# Patient Record
Sex: FEMALE | Race: WHITE | Employment: UNEMPLOYED | URBAN - METROPOLITAN AREA
[De-identification: names, ages, dates, MRNs, and addresses within clinical notes are randomized per-mention and may not be internally consistent; named-entity substitution may affect disease eponyms.]

---

## 2017-02-20 RX ORDER — MOMETASONE FUROATE AND FORMOTEROL FUMARATE DIHYDRATE 200; 5 UG/1; UG/1
AEROSOL RESPIRATORY (INHALATION)
Qty: 13 G | Refills: 0 | OUTPATIENT
Start: 2017-02-20

## 2017-07-12 ENCOUNTER — PATIENT OUTREACH (OUTPATIENT)
Dept: FAMILY MEDICINE CLINIC | Facility: CLINIC | Age: 22
End: 2017-07-12

## 2017-10-22 ENCOUNTER — CHARTING TRANS (OUTPATIENT)
Dept: URGENT CARE | Age: 22
End: 2017-10-22

## 2017-12-04 ENCOUNTER — HOSPITAL ENCOUNTER (EMERGENCY)
Facility: HOSPITAL | Age: 22
Discharge: HOME | End: 2017-12-04
Attending: EMERGENCY MEDICINE
Payer: COMMERCIAL

## 2017-12-04 VITALS
HEIGHT: 64 IN | DIASTOLIC BLOOD PRESSURE: 66 MMHG | BODY MASS INDEX: 24.41 KG/M2 | TEMPERATURE: 97 F | SYSTOLIC BLOOD PRESSURE: 100 MMHG | WEIGHT: 143 LBS | OXYGEN SATURATION: 100 % | HEART RATE: 79 BPM | RESPIRATION RATE: 18 BRPM

## 2017-12-04 DIAGNOSIS — O21.0 HYPEREMESIS GRAVIDARUM: Primary | ICD-10-CM

## 2017-12-04 PROCEDURE — 87086 URINE CULTURE/COLONY COUNT: CPT | Performed by: EMERGENCY MEDICINE

## 2017-12-04 PROCEDURE — 96376 TX/PRO/DX INJ SAME DRUG ADON: CPT

## 2017-12-04 PROCEDURE — 81001 URINALYSIS AUTO W/SCOPE: CPT | Performed by: EMERGENCY MEDICINE

## 2017-12-04 PROCEDURE — 96361 HYDRATE IV INFUSION ADD-ON: CPT

## 2017-12-04 PROCEDURE — 96374 THER/PROPH/DIAG INJ IV PUSH: CPT

## 2017-12-04 PROCEDURE — 80048 BASIC METABOLIC PNL TOTAL CA: CPT | Performed by: EMERGENCY MEDICINE

## 2017-12-04 PROCEDURE — 99285 EMERGENCY DEPT VISIT HI MDM: CPT

## 2017-12-04 PROCEDURE — 99284 EMERGENCY DEPT VISIT MOD MDM: CPT

## 2017-12-04 RX ORDER — ONDANSETRON 4 MG/1
4 TABLET, ORALLY DISINTEGRATING ORAL EVERY 4 HOURS PRN
Qty: 10 TABLET | Refills: 0 | Status: SHIPPED | OUTPATIENT
Start: 2017-12-04 | End: 2017-12-11

## 2017-12-04 RX ORDER — DEXTROSE AND SODIUM CHLORIDE 5; .9 G/100ML; G/100ML
INJECTION, SOLUTION INTRAVENOUS ONCE
Status: COMPLETED | OUTPATIENT
Start: 2017-12-04 | End: 2017-12-04

## 2017-12-04 RX ORDER — ONDANSETRON 2 MG/ML
4 INJECTION INTRAMUSCULAR; INTRAVENOUS ONCE
Status: COMPLETED | OUTPATIENT
Start: 2017-12-04 | End: 2017-12-04

## 2017-12-04 NOTE — ED INITIAL ASSESSMENT (HPI)
10 weeks pregnant. Hyperemesis since 4 weeks. Changed OB. Recently treated at another hospital with IV fluids 3 times per week. Pain left hip. No cramping. No vaginal discharge.

## 2017-12-04 NOTE — CM/SW NOTE
Asked to see patient by ER MD. Patient with multiple ER visits for hyperemesis. Current OB at [address] not wanting to order home IVF. Patient is waiting to hear back from Dr. Richard Pulido office re: new appointment, but nothing available soon. She has no PCP.

## 2017-12-04 NOTE — ED NOTES
Patient continues to c/o nausea and unable to tolerate full cup of water. Second dose of zofran administered. Patient ambulating to washroom with steady gait and provided with cup for urine sample.

## 2017-12-04 NOTE — ED PROVIDER NOTES
Patient Seen in: [facility] Emergency Department    History   Patient presents with:  Pregnancy Issues (gynecologic)    Stated Complaint: hyperemesis, weight loss    HPI    78-year-old female who is 10 weeks pregnant complaining of vomiting the alannah Triage Vitals [12/04/17 1203]  BP: 128/77  Pulse: 92  Resp: 14  Temp: (!) 97.1 °F (36.2 °C)  Temp src: Oral  SpO2: 98 %  O2 Device: None (Room air)    Current:/66   Pulse 79   Temp (!) 97.1 °F (36.2 °C) (Oral)   Resp 18   Ht 162.6 cm (5' 4\")   Wt 64 taking these medications    !! ondansetron 4 MG Oral Tablet Dispersible  Take 1 tablet (4 mg total) by mouth every 4 (four) hours as needed for Nausea. Qty: 10 tablet Refills: 0    !! - Potential duplicate medications found. Please discuss with provider.

## 2017-12-04 NOTE — ED NOTES
Discharge instructions reviewed with patient, patient instructed to call OB in AM to set up appointment and discuss POC. Patient verbalized understanding, all questions answered. Patient discharged home in good condition.

## 2017-12-07 PROCEDURE — 87086 URINE CULTURE/COLONY COUNT: CPT | Performed by: OBSTETRICS & GYNECOLOGY

## 2017-12-14 ENCOUNTER — HOSPITAL ENCOUNTER (OUTPATIENT)
Dept: PERIOP | Facility: HOSPITAL | Age: 22
Discharge: HOME | End: 2017-12-14
Attending: OBSTETRICS & GYNECOLOGY
Payer: COMMERCIAL

## 2017-12-14 ENCOUNTER — APPOINTMENT (OUTPATIENT)
Dept: LAB | Facility: HOSPITAL | Age: 22
End: 2017-12-14
Attending: OBSTETRICS & GYNECOLOGY
Payer: COMMERCIAL

## 2017-12-14 VITALS
HEART RATE: 70 BPM | RESPIRATION RATE: 16 BRPM | TEMPERATURE: 99 F | DIASTOLIC BLOOD PRESSURE: 70 MMHG | SYSTOLIC BLOOD PRESSURE: 115 MMHG

## 2017-12-14 DIAGNOSIS — O21.0 HYPEREMESIS GRAVIDARUM: ICD-10-CM

## 2017-12-14 PROCEDURE — 76937 US GUIDE VASCULAR ACCESS: CPT

## 2017-12-14 PROCEDURE — 96374 THER/PROPH/DIAG INJ IV PUSH: CPT

## 2017-12-14 PROCEDURE — 96367 TX/PROPH/DG ADDL SEQ IV INF: CPT

## 2017-12-14 PROCEDURE — 96375 TX/PRO/DX INJ NEW DRUG ADDON: CPT

## 2017-12-14 PROCEDURE — 36415 COLL VENOUS BLD VENIPUNCTURE: CPT

## 2017-12-14 PROCEDURE — 80053 COMPREHEN METABOLIC PANEL: CPT

## 2017-12-14 PROCEDURE — 96365 THER/PROPH/DIAG IV INF INIT: CPT

## 2017-12-14 PROCEDURE — 96361 HYDRATE IV INFUSION ADD-ON: CPT

## 2017-12-14 PROCEDURE — 96366 THER/PROPH/DIAG IV INF ADDON: CPT

## 2017-12-14 PROCEDURE — 96360 HYDRATION IV INFUSION INIT: CPT

## 2017-12-14 PROCEDURE — 36569 INSJ PICC 5 YR+ W/O IMAGING: CPT

## 2017-12-14 RX ORDER — DEXTROSE, SODIUM CHLORIDE, SODIUM LACTATE, POTASSIUM CHLORIDE, AND CALCIUM CHLORIDE 5; .6; .31; .03; .02 G/100ML; G/100ML; G/100ML; G/100ML; G/100ML
INJECTION, SOLUTION INTRAVENOUS CONTINUOUS
Status: DISCONTINUED | OUTPATIENT
Start: 2017-12-14 | End: 2017-12-15

## 2017-12-14 RX ORDER — ONDANSETRON 2 MG/ML
4 INJECTION INTRAMUSCULAR; INTRAVENOUS ONCE
Status: COMPLETED | OUTPATIENT
Start: 2017-12-14 | End: 2017-12-14

## 2017-12-14 RX ORDER — SODIUM CHLORIDE 0.9 % (FLUSH) 0.9 %
10 SYRINGE (ML) INJECTION EVERY 12 HOURS
Status: DISCONTINUED | OUTPATIENT
Start: 2017-12-14 | End: 2017-12-14

## 2017-12-14 RX ORDER — SODIUM CHLORIDE 0.9 % (FLUSH) 0.9 %
10 SYRINGE (ML) INJECTION AS NEEDED
Status: DISCONTINUED | OUTPATIENT
Start: 2017-12-14 | End: 2017-12-15

## 2017-12-14 RX ADMIN — DEXTROSE, SODIUM CHLORIDE, SODIUM LACTATE, POTASSIUM CHLORIDE, AND CALCIUM CHLORIDE: 5; .6; .31; .03; .02 INJECTION, SOLUTION INTRAVENOUS at 08:50:00

## 2017-12-14 RX ADMIN — ONDANSETRON 4 MG: 2 INJECTION INTRAMUSCULAR; INTRAVENOUS at 08:56:00

## 2017-12-14 NOTE — OR NURSING
Patient arrived to floor, vitals taken and name band placed after identification verified. IV infusion started. Patient resting in chair. Will monitor.

## 2017-12-14 NOTE — OR NURSING
Infusion completed without difficulty, patient tolerated well. PICC line flushed with 0.9 NS per protocol. Patient ambulated off floor in stable condition.

## 2018-01-05 PROBLEM — O21.0 HYPEREMESIS AFFECTING PREGNANCY, ANTEPARTUM: Status: ACTIVE | Noted: 2018-01-05

## 2018-02-08 PROCEDURE — 82105 ALPHA-FETOPROTEIN SERUM: CPT | Performed by: OBSTETRICS & GYNECOLOGY

## 2018-02-08 PROCEDURE — 36415 COLL VENOUS BLD VENIPUNCTURE: CPT | Performed by: OBSTETRICS & GYNECOLOGY

## 2018-03-23 ENCOUNTER — HOSPITAL ENCOUNTER (EMERGENCY)
Facility: HOSPITAL | Age: 23
Discharge: HOME | End: 2018-03-23
Attending: EMERGENCY MEDICINE
Payer: COMMERCIAL

## 2018-03-23 ENCOUNTER — APPOINTMENT (OUTPATIENT)
Dept: CT IMAGING | Facility: HOSPITAL | Age: 23
End: 2018-03-23
Attending: EMERGENCY MEDICINE
Payer: COMMERCIAL

## 2018-03-23 VITALS
SYSTOLIC BLOOD PRESSURE: 112 MMHG | OXYGEN SATURATION: 98 % | HEART RATE: 71 BPM | BODY MASS INDEX: 27.46 KG/M2 | DIASTOLIC BLOOD PRESSURE: 62 MMHG | HEIGHT: 63 IN | RESPIRATION RATE: 23 BRPM | WEIGHT: 155 LBS

## 2018-03-23 DIAGNOSIS — R42 DIZZINESS: ICD-10-CM

## 2018-03-23 DIAGNOSIS — R06.00 DYSPNEA, UNSPECIFIED TYPE: Primary | ICD-10-CM

## 2018-03-23 LAB
ALBUMIN SERPL-MCNC: 2.7 G/DL (ref 3.5–4.8)
ALP LIVER SERPL-CCNC: 64 U/L (ref 37–98)
ALT SERPL-CCNC: 12 U/L (ref 14–54)
AST SERPL-CCNC: 12 U/L (ref 15–41)
ATRIAL RATE: 80 BPM
BASOPHILS # BLD AUTO: 0.06 X10(3) UL (ref 0–0.1)
BASOPHILS NFR BLD AUTO: 0.7 %
BILIRUB SERPL-MCNC: 0.2 MG/DL (ref 0.1–2)
BUN BLD-MCNC: 5 MG/DL (ref 8–20)
CALCIUM BLD-MCNC: 8.5 MG/DL (ref 8.3–10.3)
CHLORIDE: 110 MMOL/L (ref 101–111)
CO2: 21 MMOL/L (ref 22–32)
CREAT BLD-MCNC: 0.56 MG/DL (ref 0.55–1.02)
EOSINOPHIL # BLD AUTO: 0.26 X10(3) UL (ref 0–0.3)
EOSINOPHIL NFR BLD AUTO: 2.9 %
ERYTHROCYTE [DISTWIDTH] IN BLOOD BY AUTOMATED COUNT: 11.9 % (ref 11.5–16)
GLUCOSE BLD-MCNC: 103 MG/DL (ref 70–99)
HCT VFR BLD AUTO: 34.2 % (ref 34–50)
HGB BLD-MCNC: 12.1 G/DL (ref 12–16)
IMMATURE GRANULOCYTE COUNT: 0.04 X10(3) UL (ref 0–1)
IMMATURE GRANULOCYTE RATIO %: 0.4 %
LYMPHOCYTES # BLD AUTO: 1.63 X10(3) UL (ref 0.9–4)
LYMPHOCYTES NFR BLD AUTO: 17.9 %
M PROTEIN MFR SERPL ELPH: 6.1 G/DL (ref 6.1–8.3)
MCH RBC QN AUTO: 31.9 PG (ref 27–33.2)
MCHC RBC AUTO-ENTMCNC: 35.4 G/DL (ref 31–37)
MCV RBC AUTO: 90.2 FL (ref 81–100)
MONOCYTES # BLD AUTO: 0.5 X10(3) UL (ref 0.1–1)
MONOCYTES NFR BLD AUTO: 5.5 %
NEUTROPHIL ABS PRELIM: 6.62 X10 (3) UL (ref 1.3–6.7)
NEUTROPHILS # BLD AUTO: 6.62 X10(3) UL (ref 1.3–6.7)
NEUTROPHILS NFR BLD AUTO: 72.6 %
P AXIS: 20 DEGREES
P-R INTERVAL: 128 MS
PLATELET # BLD AUTO: 226 10(3)UL (ref 150–450)
POTASSIUM SERPL-SCNC: 3.6 MMOL/L (ref 3.6–5.1)
Q-T INTERVAL: 388 MS
QRS DURATION: 70 MS
QTC CALCULATION (BEZET): 447 MS
R AXIS: 67 DEGREES
RBC # BLD AUTO: 3.79 X10(6)UL (ref 3.8–5.1)
RED CELL DISTRIBUTION WIDTH-SD: 39 FL (ref 35.1–46.3)
SODIUM SERPL-SCNC: 141 MMOL/L (ref 136–144)
T AXIS: 26 DEGREES
VENTRICULAR RATE: 80 BPM
WBC # BLD AUTO: 9.1 X10(3) UL (ref 4–13)

## 2018-03-23 PROCEDURE — 93010 ELECTROCARDIOGRAM REPORT: CPT

## 2018-03-23 PROCEDURE — 99285 EMERGENCY DEPT VISIT HI MDM: CPT

## 2018-03-23 PROCEDURE — 71275 CT ANGIOGRAPHY CHEST: CPT | Performed by: EMERGENCY MEDICINE

## 2018-03-23 PROCEDURE — 93005 ELECTROCARDIOGRAM TRACING: CPT

## 2018-03-23 PROCEDURE — 80053 COMPREHEN METABOLIC PANEL: CPT | Performed by: EMERGENCY MEDICINE

## 2018-03-23 PROCEDURE — 85025 COMPLETE CBC W/AUTO DIFF WBC: CPT | Performed by: EMERGENCY MEDICINE

## 2018-03-23 PROCEDURE — 36415 COLL VENOUS BLD VENIPUNCTURE: CPT

## 2018-03-23 NOTE — ED PROVIDER NOTES
Patient Seen in: [facility] Emergency Department    History   Patient presents with:  Dizziness (neurologic)    Stated Complaint: lightheaded, 25 weeks pregnant    HPI    67-year-old female presents to the emergency department complaining of having HPI.  Constitutional and vital signs reviewed. All other systems reviewed and negative except as noted above.     Physical Exam   ED Triage Vitals [03/23/18 1249]  BP: 120/73  Pulse: 88  Resp: 12  Temp: n/a  Temp src: Temporal  SpO2: 98 %  O2 Device: N DIFFERENTIAL WITH PLATELET.   Procedure                               Abnormality         Status                     ---------                               -----------         ------                     CBC W/ DIFFERENTIAL[[id number]]          Abnormal

## 2018-03-23 NOTE — ED NOTES
OB charge, [facility] Cornea aware pt is here for feeling lightheaded, 25 weeks pregnant, no abdominal pain, pt of Dr. Kelly Lopez.

## 2018-03-23 NOTE — ED INITIAL ASSESSMENT (HPI)
Pt c/o lightheaded, dizzy, chest pain and shortness of breath. Pt is 25-weeks pregnant and rpt symptoms started about 1hr prior to arrival, Pt states she was cleaning at home when the symptoms came on.

## 2018-04-13 PROBLEM — Z98.891 PREVIOUS CESAREAN SECTION: Status: ACTIVE | Noted: 2018-04-13

## 2018-04-17 PROCEDURE — 82950 GLUCOSE TEST: CPT | Performed by: OBSTETRICS & GYNECOLOGY

## 2018-04-17 PROCEDURE — 87389 HIV-1 AG W/HIV-1&-2 AB AG IA: CPT | Performed by: OBSTETRICS & GYNECOLOGY

## 2018-04-30 PROCEDURE — 82239 BILE ACIDS TOTAL: CPT | Performed by: OBSTETRICS & GYNECOLOGY

## 2018-05-21 ENCOUNTER — HOSPITAL ENCOUNTER (OUTPATIENT)
Facility: HOSPITAL | Age: 23
Setting detail: OBSERVATION
Discharge: HOME | End: 2018-05-21
Attending: OBSTETRICS & GYNECOLOGY | Admitting: OBSTETRICS & GYNECOLOGY
Payer: COMMERCIAL

## 2018-05-21 ENCOUNTER — APPOINTMENT (OUTPATIENT)
Dept: ULTRASOUND IMAGING | Facility: HOSPITAL | Age: 23
End: 2018-05-21
Attending: OBSTETRICS & GYNECOLOGY
Payer: COMMERCIAL

## 2018-05-21 VITALS
RESPIRATION RATE: 14 BRPM | BODY MASS INDEX: 31.26 KG/M2 | HEART RATE: 102 BPM | WEIGHT: 178.63 LBS | HEIGHT: 63.5 IN | SYSTOLIC BLOOD PRESSURE: 112 MMHG | DIASTOLIC BLOOD PRESSURE: 67 MMHG | TEMPERATURE: 98 F

## 2018-05-21 PROBLEM — Z34.90 PREGNANCY: Status: ACTIVE | Noted: 2018-05-21

## 2018-05-21 PROCEDURE — 81002 URINALYSIS NONAUTO W/O SCOPE: CPT

## 2018-05-21 PROCEDURE — 59025 FETAL NON-STRESS TEST: CPT

## 2018-05-21 PROCEDURE — 76815 OB US LIMITED FETUS(S): CPT | Performed by: OBSTETRICS & GYNECOLOGY

## 2018-05-21 NOTE — H&P
Aggie  Stoelting Patient Status:  Observation    9/10/1995 MRN [id number]   Location [address] [facility] Attending Paloma Marte MD   [address] Day # 0 PCP None Pcp     Date of Admission:   Known Problems Father      Social History:    Smoking status: Former Smoker     Smokeless tobacco: Never Used    Comment: 5 yrs ago recreationally    Alcohol use No    Comment: currently pregnant       Home Meds:   Prescriptions Prior to Admission:  Jodee England negative  Urine dipstick trace leuk, no blood, no nitrite     ASSESSMENT:   at 33+5 weeks here for right lower back pain and decreased FM.     PLAN:  1) Decreased FM: NST reassuring--will send for DELMIS--kick counts reviewed  2) Low back pain: likely musc

## 2018-05-22 NOTE — PROGRESS NOTES
Discharge instructions reviewed with pt; pt to keep next scheduled appointment; pt to notify MD if pain becomes worse; pt states verbal understanding; pt discharge to home in stable condition

## 2018-06-06 ENCOUNTER — HOSPITAL ENCOUNTER (OUTPATIENT)
Facility: HOSPITAL | Age: 23
Setting detail: OBSERVATION
Discharge: HOME | End: 2018-06-06
Attending: OBSTETRICS & GYNECOLOGY | Admitting: OBSTETRICS & GYNECOLOGY
Payer: COMMERCIAL

## 2018-06-06 VITALS
TEMPERATURE: 98 F | BODY MASS INDEX: 30.45 KG/M2 | HEIGHT: 63.5 IN | HEART RATE: 92 BPM | SYSTOLIC BLOOD PRESSURE: 122 MMHG | RESPIRATION RATE: 18 BRPM | DIASTOLIC BLOOD PRESSURE: 79 MMHG | WEIGHT: 174 LBS

## 2018-06-06 PROCEDURE — 81002 URINALYSIS NONAUTO W/O SCOPE: CPT

## 2018-06-06 PROCEDURE — 59025 FETAL NON-STRESS TEST: CPT

## 2018-06-06 NOTE — NST
Nonstress Test   Patient: Marquette Mohs    Gestation: 36w0d    NST:       Variability: Moderate           Accelerations: Yes           Decelerations: None            Baseline: 125 BPM           Uterine Irritability: No           Contractions: Not p

## 2018-06-06 NOTE — PROGRESS NOTES
, here with complaints of lower back pain off and on since 0200 today, with some vaginal discharge that was pink in color. Pt looks comfortable at this time. Pt with history of previous  section for breech presentation .

## 2018-06-06 NOTE — PROGRESS NOTES
Dr Asuncion Dubin informed that pt dozing off and on in room but tearful during sve, pt has mild occasional contractions and fetus is very active.  Order rec'd to discharge pt home with good labor instructions, and pt able to use heat to lower back, warm showers an

## 2018-06-06 NOTE — PROGRESS NOTES
Pt given verbal and written discharge instructions and verbalized understanding of them. Pt appears eager to go home and try heating pad to her lower back and tylenol. Pt has appt next week and encouraged to keep all scheduled appts.   Pt home ambulatory i

## 2018-06-06 NOTE — PROGRESS NOTES
Pt dozing when entering room to perform an sve, as ordered, pt still complains of back pain that is 6/10. Pt tearful during sve. Occasional mild contr noted on tracing but not palpated.

## 2018-06-13 ENCOUNTER — HOSPITAL ENCOUNTER (OUTPATIENT)
Facility: HOSPITAL | Age: 23
Setting detail: OBSERVATION
Discharge: HOME | End: 2018-06-13
Attending: OBSTETRICS & GYNECOLOGY | Admitting: OBSTETRICS & GYNECOLOGY
Payer: COMMERCIAL

## 2018-06-13 VITALS
TEMPERATURE: 99 F | DIASTOLIC BLOOD PRESSURE: 72 MMHG | HEART RATE: 96 BPM | HEIGHT: 63.5 IN | SYSTOLIC BLOOD PRESSURE: 116 MMHG | WEIGHT: 175 LBS | RESPIRATION RATE: 18 BRPM | BODY MASS INDEX: 30.62 KG/M2

## 2018-06-13 PROCEDURE — 59025 FETAL NON-STRESS TEST: CPT

## 2018-06-14 NOTE — NST
Nonstress Test   Patient: Damián Toney    Gestation: 37w0d    NST:       Variability: Moderate           Accelerations: Yes           Decelerations: None            Baseline: 125 BPM           Uterine Irritability: No           Contractions: Irreg

## 2018-06-14 NOTE — PROGRESS NOTES
Pt admitted to Trg 2 with complaints of contractions. States she has had 9 contractions in the last 2 hours. Pt is a previous C/S. Denies pain at the incisional sire. Denies any vaginal bleeding or LOF. Reports +FM and does state that her back hurts.

## 2018-06-22 ENCOUNTER — TELEPHONE (OUTPATIENT)
Dept: OBGYN UNIT | Facility: HOSPITAL | Age: 23
End: 2018-06-22

## 2018-06-25 ENCOUNTER — HOSPITAL ENCOUNTER (OUTPATIENT)
Facility: HOSPITAL | Age: 23
Setting detail: OBSERVATION
Discharge: HOME | End: 2018-06-26
Attending: OBSTETRICS & GYNECOLOGY | Admitting: OBSTETRICS & GYNECOLOGY
Payer: COMMERCIAL

## 2018-06-25 ENCOUNTER — APPOINTMENT (OUTPATIENT)
Dept: ULTRASOUND IMAGING | Facility: HOSPITAL | Age: 23
End: 2018-06-25
Attending: OBSTETRICS & GYNECOLOGY
Payer: COMMERCIAL

## 2018-06-25 PROCEDURE — 85025 COMPLETE CBC W/AUTO DIFF WBC: CPT | Performed by: OBSTETRICS & GYNECOLOGY

## 2018-06-25 PROCEDURE — 80053 COMPREHEN METABOLIC PANEL: CPT | Performed by: OBSTETRICS & GYNECOLOGY

## 2018-06-25 PROCEDURE — 86870 RBC ANTIBODY IDENTIFICATION: CPT | Performed by: OBSTETRICS & GYNECOLOGY

## 2018-06-25 PROCEDURE — 81002 URINALYSIS NONAUTO W/O SCOPE: CPT

## 2018-06-25 PROCEDURE — 76815 OB US LIMITED FETUS(S): CPT | Performed by: OBSTETRICS & GYNECOLOGY

## 2018-06-25 PROCEDURE — 86901 BLOOD TYPING SEROLOGIC RH(D): CPT | Performed by: OBSTETRICS & GYNECOLOGY

## 2018-06-25 PROCEDURE — 86701 HIV-1ANTIBODY: CPT | Performed by: OBSTETRICS & GYNECOLOGY

## 2018-06-25 PROCEDURE — 96361 HYDRATE IV INFUSION ADD-ON: CPT

## 2018-06-25 PROCEDURE — 86850 RBC ANTIBODY SCREEN: CPT | Performed by: OBSTETRICS & GYNECOLOGY

## 2018-06-25 PROCEDURE — 86900 BLOOD TYPING SEROLOGIC ABO: CPT | Performed by: OBSTETRICS & GYNECOLOGY

## 2018-06-25 PROCEDURE — 96360 HYDRATION IV INFUSION INIT: CPT

## 2018-06-25 RX ORDER — SODIUM CHLORIDE, SODIUM LACTATE, POTASSIUM CHLORIDE, CALCIUM CHLORIDE 600; 310; 30; 20 MG/100ML; MG/100ML; MG/100ML; MG/100ML
INJECTION, SOLUTION INTRAVENOUS CONTINUOUS
Status: DISCONTINUED | OUTPATIENT
Start: 2018-06-25 | End: 2018-06-26

## 2018-06-25 RX ORDER — ONDANSETRON 2 MG/ML
4 INJECTION INTRAMUSCULAR; INTRAVENOUS EVERY 6 HOURS PRN
Status: DISCONTINUED | OUTPATIENT
Start: 2018-06-25 | End: 2018-06-26

## 2018-06-26 VITALS
SYSTOLIC BLOOD PRESSURE: 109 MMHG | WEIGHT: 180 LBS | OXYGEN SATURATION: 97 % | DIASTOLIC BLOOD PRESSURE: 64 MMHG | TEMPERATURE: 98 F | HEART RATE: 96 BPM | BODY MASS INDEX: 31 KG/M2 | RESPIRATION RATE: 16 BRPM

## 2018-06-26 PROBLEM — O21.0 HYPEREMESIS AFFECTING PREGNANCY, ANTEPARTUM: Status: RESOLVED | Noted: 2018-01-05 | Resolved: 2018-06-26

## 2018-06-26 PROBLEM — R55 SYNCOPE AND COLLAPSE: Status: ACTIVE | Noted: 2018-06-26

## 2018-06-26 NOTE — H&P
Aggie [address] Patient Status:  Observation    9/10/1995 MRN [id number]   Location [address] [facility] Attending Mercedes Silvestre MD   Hosp Day # 0 PCP None Pcp     Date of Admission:  2018 x4  11/7/12: TONSILLECTOMY      Comment: with deviated septum repair  Family History:   Family History   Problem Relation Age of Onset   • Psychiatric Mother    • Depression Brother    • Suicide History Brother    • Anxiety Sister    • Depression Sister mod roxi, +accels, no decels   Ctx every 10-15 min      Cervix:  Neuro: not digitally examined   Grossly intact     Lab Review:  B, Rh -, Rubella-immune, Hepatitis B surface antigen non-reactive  Passive Rh antibody present due to Rhogam at 28 weeks     A

## 2018-06-26 NOTE — PROGRESS NOTES
This RN in room. Pt resting quietly. Pt states she feels occas mild ctxs. Pt denies LOF and vaginal bleeding. +FM. PT denies pain. Pt states she is not dizzy or lightheaded at this time. POC discussed with pt.  Pt verbalizes understanding and agrees with PO

## 2018-06-26 NOTE — PROGRESS NOTES
23year old  at 38+5 weeks gestation admitted to triage stating that she blacked out and woke up on the floor at about [phone] today.   Pt stated that she was lightheaded prior and does not remember falling and does not know if she hit her abdomen and she

## 2018-06-26 NOTE — PROGRESS NOTES
RN called to room. Pt c/o feeling dizzy with numbness in her shoulders and fingers. Pt is lying down on her side. VSS. Will continue to monitor.

## 2018-06-27 ENCOUNTER — ANESTHESIA EVENT (OUTPATIENT)
Dept: OBGYN UNIT | Facility: HOSPITAL | Age: 23
End: 2018-06-27
Payer: COMMERCIAL

## 2018-06-27 NOTE — ANESTHESIA PREPROCEDURE EVALUATION
PRE-OP EVALUATION    Patient Name: Douglas Cheadle    Pre-op Diagnosis: * No pre-op diagnosis entered *    Procedure(s):      Surgeon(s) and Role:     * Eddie Espinosa MD - Primary    Pre-op vitals reviewed.   Temp: 98.1 °F (36.7 °C)  Pulse: 85  R EXTRACTION ERUPTED TOOTH/EXR      Comment: WISDOM TEETH WITH ANESTHESIA  3/2015: LUIS ALFREDO Da Silva  No date: OTHER SURGICAL HISTORY      Comment: [place] teeth x4  11/7/12: TONSILLECTOMY      Comment: with deviated septum repair     Smoking status: Former Smoker nerve damage explained. Post op pain ,management explained. Agrees to proceed. Consent signed.                   Present on Admission:  **None**

## 2018-06-28 ENCOUNTER — ANESTHESIA (OUTPATIENT)
Dept: OBGYN UNIT | Facility: HOSPITAL | Age: 23
End: 2018-06-28
Payer: COMMERCIAL

## 2018-06-28 ENCOUNTER — HOSPITAL ENCOUNTER (INPATIENT)
Facility: HOSPITAL | Age: 23
LOS: 3 days | Discharge: HOME | End: 2018-07-01
Attending: OBSTETRICS & GYNECOLOGY | Admitting: OBSTETRICS & GYNECOLOGY
Payer: COMMERCIAL

## 2018-06-28 ENCOUNTER — SURGERY (OUTPATIENT)
Age: 23
End: 2018-06-28

## 2018-06-28 PROBLEM — Z98.891 S/P CESAREAN SECTION: Status: ACTIVE | Noted: 2018-06-28

## 2018-06-28 LAB
ANTIBODY SCREEN: POSITIVE
BASOPHILS # BLD AUTO: 0.02 X10(3) UL (ref 0–0.1)
BASOPHILS NFR BLD AUTO: 0.2 %
BILIRUBIN URINE: NEGATIVE
BLOOD URINE: NEGATIVE
CONTROL RUN WITHIN 24 HOURS?: YES
EOSINOPHIL # BLD AUTO: 0.15 X10(3) UL (ref 0–0.3)
EOSINOPHIL NFR BLD AUTO: 1.7 %
ERYTHROCYTE [DISTWIDTH] IN BLOOD BY AUTOMATED COUNT: 12.8 % (ref 11.5–16)
FETAL SCREEN RESULT: NEGATIVE
GLUCOSE URINE: NEGATIVE
HCT VFR BLD AUTO: 34.4 % (ref 34–50)
HGB BLD-MCNC: 11.5 G/DL (ref 12–16)
IMMATURE GRANULOCYTE COUNT: 0.03 X10(3) UL (ref 0–1)
IMMATURE GRANULOCYTE RATIO %: 0.3 %
KETONE URINE: NEGATIVE
LYMPHOCYTES # BLD AUTO: 2.08 X10(3) UL (ref 0.9–4)
LYMPHOCYTES NFR BLD AUTO: 23.7 %
MCH RBC QN AUTO: 27.7 PG (ref 27–33.2)
MCHC RBC AUTO-ENTMCNC: 33.4 G/DL (ref 31–37)
MCV RBC AUTO: 82.9 FL (ref 81–100)
MONOCYTES # BLD AUTO: 0.64 X10(3) UL (ref 0.1–1)
MONOCYTES NFR BLD AUTO: 7.3 %
NEUTROPHIL ABS PRELIM: 5.87 X10 (3) UL (ref 1.3–6.7)
NEUTROPHILS # BLD AUTO: 5.87 X10(3) UL (ref 1.3–6.7)
NEUTROPHILS NFR BLD AUTO: 66.8 %
NITRITE URINE: NEGATIVE
PH URINE: 7 (ref 5–8)
PLATELET # BLD AUTO: 195 10(3)UL (ref 150–450)
PROTEIN URINE: NEGATIVE
RBC # BLD AUTO: 4.15 X10(6)UL (ref 3.8–5.1)
RED CELL DISTRIBUTION WIDTH-SD: 38.6 FL (ref 35.1–46.3)
RH BLOOD TYPE: NEGATIVE
SPEC GRAVITY: 1.02 (ref 1–1.03)
T PALLIDUM AB SER QL IA: NONREACTIVE
URINE CLARITY: CLEAR
URINE COLOR: YELLOW
UROBILINOGEN URINE: 0.2
WBC # BLD AUTO: 8.8 X10(3) UL (ref 4–13)

## 2018-06-28 PROCEDURE — 81002 URINALYSIS NONAUTO W/O SCOPE: CPT

## 2018-06-28 PROCEDURE — 85025 COMPLETE CBC W/AUTO DIFF WBC: CPT | Performed by: OBSTETRICS & GYNECOLOGY

## 2018-06-28 PROCEDURE — 86901 BLOOD TYPING SEROLOGIC RH(D): CPT | Performed by: OBSTETRICS & GYNECOLOGY

## 2018-06-28 PROCEDURE — 86870 RBC ANTIBODY IDENTIFICATION: CPT | Performed by: OBSTETRICS & GYNECOLOGY

## 2018-06-28 PROCEDURE — 86780 TREPONEMA PALLIDUM: CPT | Performed by: OBSTETRICS & GYNECOLOGY

## 2018-06-28 PROCEDURE — 85461 HEMOGLOBIN FETAL: CPT | Performed by: OBSTETRICS & GYNECOLOGY

## 2018-06-28 PROCEDURE — 86900 BLOOD TYPING SEROLOGIC ABO: CPT | Performed by: OBSTETRICS & GYNECOLOGY

## 2018-06-28 PROCEDURE — 86850 RBC ANTIBODY SCREEN: CPT | Performed by: OBSTETRICS & GYNECOLOGY

## 2018-06-28 RX ORDER — SIMETHICONE 80 MG
80 TABLET,CHEWABLE ORAL 3 TIMES DAILY PRN
Status: DISCONTINUED | OUTPATIENT
Start: 2018-06-28 | End: 2018-07-01

## 2018-06-28 RX ORDER — DIPHENHYDRAMINE HYDROCHLORIDE 50 MG/ML
12.5 INJECTION INTRAMUSCULAR; INTRAVENOUS EVERY 4 HOURS PRN
Status: DISCONTINUED | OUTPATIENT
Start: 2018-06-28 | End: 2018-07-01

## 2018-06-28 RX ORDER — HYDROCODONE BITARTRATE AND ACETAMINOPHEN 5; 325 MG/1; MG/1
1 TABLET ORAL EVERY 4 HOURS PRN
Status: DISCONTINUED | OUTPATIENT
Start: 2018-06-28 | End: 2018-07-01

## 2018-06-28 RX ORDER — BISACODYL 10 MG
10 SUPPOSITORY, RECTAL RECTAL
Status: DISCONTINUED | OUTPATIENT
Start: 2018-06-28 | End: 2018-07-01

## 2018-06-28 RX ORDER — TRISODIUM CITRATE DIHYDRATE AND CITRIC ACID MONOHYDRATE 500; 334 MG/5ML; MG/5ML
30 SOLUTION ORAL ONCE
Status: COMPLETED | OUTPATIENT
Start: 2018-06-28 | End: 2018-06-28

## 2018-06-28 RX ORDER — ONDANSETRON 2 MG/ML
4 INJECTION INTRAMUSCULAR; INTRAVENOUS EVERY 6 HOURS PRN
Status: DISCONTINUED | OUTPATIENT
Start: 2018-06-28 | End: 2018-07-01

## 2018-06-28 RX ORDER — DOCUSATE SODIUM 100 MG/1
100 CAPSULE, LIQUID FILLED ORAL
Status: DISCONTINUED | OUTPATIENT
Start: 2018-06-29 | End: 2018-07-01

## 2018-06-28 RX ORDER — DIPHENHYDRAMINE HYDROCHLORIDE 50 MG/ML
25 INJECTION INTRAMUSCULAR; INTRAVENOUS ONCE AS NEEDED
Status: DISCONTINUED | OUTPATIENT
Start: 2018-06-28 | End: 2018-06-28 | Stop reason: HOSPADM

## 2018-06-28 RX ORDER — CEFAZOLIN SODIUM/WATER 2 G/20 ML
SYRINGE (ML) INTRAVENOUS
Status: COMPLETED
Start: 2018-06-28 | End: 2018-06-28

## 2018-06-28 RX ORDER — NALBUPHINE HCL 10 MG/ML
2.5 AMPUL (ML) INJECTION
Status: DISCONTINUED | OUTPATIENT
Start: 2018-06-28 | End: 2018-06-28 | Stop reason: HOSPADM

## 2018-06-28 RX ORDER — DEXTROSE, SODIUM CHLORIDE, SODIUM LACTATE, POTASSIUM CHLORIDE, AND CALCIUM CHLORIDE 5; .6; .31; .03; .02 G/100ML; G/100ML; G/100ML; G/100ML; G/100ML
INJECTION, SOLUTION INTRAVENOUS CONTINUOUS
Status: DISCONTINUED | OUTPATIENT
Start: 2018-06-28 | End: 2018-07-01

## 2018-06-28 RX ORDER — KETOROLAC TROMETHAMINE 30 MG/ML
30 INJECTION, SOLUTION INTRAMUSCULAR; INTRAVENOUS ONCE AS NEEDED
Status: DISCONTINUED | OUTPATIENT
Start: 2018-06-28 | End: 2018-06-28

## 2018-06-28 RX ORDER — SODIUM CHLORIDE, SODIUM LACTATE, POTASSIUM CHLORIDE, CALCIUM CHLORIDE 600; 310; 30; 20 MG/100ML; MG/100ML; MG/100ML; MG/100ML
INJECTION, SOLUTION INTRAVENOUS CONTINUOUS
Status: DISCONTINUED | OUTPATIENT
Start: 2018-06-28 | End: 2018-07-01

## 2018-06-28 RX ORDER — HYDROCODONE BITARTRATE AND ACETAMINOPHEN 10; 325 MG/1; MG/1
1 TABLET ORAL EVERY 4 HOURS PRN
Status: DISCONTINUED | OUTPATIENT
Start: 2018-06-28 | End: 2018-07-01

## 2018-06-28 RX ORDER — DIPHENHYDRAMINE HCL 25 MG
25 CAPSULE ORAL EVERY 4 HOURS PRN
Status: DISCONTINUED | OUTPATIENT
Start: 2018-06-28 | End: 2018-07-01

## 2018-06-28 RX ORDER — ACETAMINOPHEN 10 MG/ML
1000 INJECTION, SOLUTION INTRAVENOUS EVERY 6 HOURS PRN
Status: DISCONTINUED | OUTPATIENT
Start: 2018-06-28 | End: 2018-06-28

## 2018-06-28 RX ORDER — KETOROLAC TROMETHAMINE 30 MG/ML
30 INJECTION, SOLUTION INTRAMUSCULAR; INTRAVENOUS EVERY 6 HOURS PRN
Status: DISCONTINUED | OUTPATIENT
Start: 2018-06-28 | End: 2018-06-28

## 2018-06-28 RX ORDER — ONDANSETRON 2 MG/ML
4 INJECTION INTRAMUSCULAR; INTRAVENOUS ONCE AS NEEDED
Status: COMPLETED | OUTPATIENT
Start: 2018-06-28 | End: 2018-06-28

## 2018-06-28 RX ORDER — MORPHINE SULFATE 0.5 MG/ML
0.2 INJECTION, SOLUTION EPIDURAL; INTRATHECAL; INTRAVENOUS ONCE
Status: DISCONTINUED | OUTPATIENT
Start: 2018-06-28 | End: 2018-07-01

## 2018-06-28 RX ORDER — NALBUPHINE HCL 10 MG/ML
2.5 AMPUL (ML) INJECTION EVERY 4 HOURS PRN
Status: DISCONTINUED | OUTPATIENT
Start: 2018-06-28 | End: 2018-07-01

## 2018-06-28 RX ORDER — NALOXONE HYDROCHLORIDE 0.4 MG/ML
0.08 INJECTION, SOLUTION INTRAMUSCULAR; INTRAVENOUS; SUBCUTANEOUS
Status: ACTIVE | OUTPATIENT
Start: 2018-06-28 | End: 2018-06-29

## 2018-06-28 RX ORDER — CEFAZOLIN SODIUM/WATER 2 G/20 ML
2 SYRINGE (ML) INTRAVENOUS ONCE
Status: COMPLETED | OUTPATIENT
Start: 2018-06-28 | End: 2018-06-28

## 2018-06-28 RX ORDER — ZOLPIDEM TARTRATE 5 MG/1
5 TABLET ORAL NIGHTLY PRN
Status: DISCONTINUED | OUTPATIENT
Start: 2018-06-28 | End: 2018-07-01

## 2018-06-28 NOTE — H&P
[address] Patient Status:  Inpatient    9/10/1995 MRN [id number]   Location [facility] Attending Frank Cordoba MD   Hosp Day # 0 PCP None Pcp     SUBJECTIVE:    Chandrakant Solorio currently pregnant       Home Meds:   Prescriptions Prior to Admission:  Mometasone Furo-Formoterol Fum (DULERA) 200-5 MCG/ACT Inhalation Aerosol Inhale 1 puff into the lungs 2 (two) times daily.  Disp: 13 g Rfl: 0 6/28/2018 at Unknown time   Ondansetron HC 1/7 weeks gestation. 2. Previous  - desires repeat. Risks reviewed including bleeding, infection, damage to internal organs and thromboembolic events  3.  B negative -         Risks, benefits, alternatives and possible complications have been dis

## 2018-06-28 NOTE — PROGRESS NOTES
Pt is a 23year old female admitted to TRG4/TRG4-A, Patient presents with:  Scheduled      Pt is 39w1d intra-uterine pregnancy. Denies any leaking of fluid. Reports +fetal movement. History obtained, consents signed.  Oriented to room, staff, and

## 2018-06-28 NOTE — ANESTHESIA POSTPROCEDURE EVALUATION
[address] [address] Patient Status:  Inpatient   Age/Gender 23year old female MRN [id number]   Location [address] [place] [address] Attending Jak Reynaga MD   Hosp Day # 0 PCP None Pcp       Anesthesia Post-op Note    Proce

## 2018-06-28 NOTE — OPERATIVE REPORT
[facility]   Section - Operative Note    Marlyn Moreira Patient Status:  Inpatient    9/10/1995 MRN [id number]   Location [facility] Attending Ciara Ospina MD   Hosp Day # 0 PCP None Pcp       Preoperative uterus was cleared of all clots and debris. The Grant O retractor was placed. Uterus, tubes and ovaries were normal in appearance. The first layer of the hysterotomy was closed using 0 vicryl. A second imbricating layer was placed with 0 vircyl.   The i

## 2018-06-28 NOTE — PROGRESS NOTES
Patient admitted to mother baby unit with infant. Hugs and kiss tag applied and bonded in mother baby. Call light within reach.

## 2018-06-28 NOTE — PROGRESS NOTES
Patient transferred to mother/baby room 1116 per cart in stable condition with baby and personal belongings. Accompanied by  and staff. Report given to mother/baby RN.

## 2018-06-28 NOTE — PLAN OF CARE
Problem: Patient/Family Goals  Goal: Patient/Family Long Term Goal  Patient's Long Term Goal: Safe  Delivery    Interventions:  - Follow Protocols  - See additional Care Plan goals for specific interventions    Outcome: Progressing    Goal: Patient

## 2018-06-29 LAB
BASOPHILS # BLD AUTO: 0.03 X10(3) UL (ref 0–0.1)
BASOPHILS NFR BLD AUTO: 0.3 %
EOSINOPHIL # BLD AUTO: 0.08 X10(3) UL (ref 0–0.3)
EOSINOPHIL NFR BLD AUTO: 0.7 %
ERYTHROCYTE [DISTWIDTH] IN BLOOD BY AUTOMATED COUNT: 13 % (ref 11.5–16)
HCT VFR BLD AUTO: 35.6 % (ref 34–50)
HGB BLD-MCNC: 11.9 G/DL (ref 12–16)
IMMATURE GRANULOCYTE COUNT: 0.07 X10(3) UL (ref 0–1)
IMMATURE GRANULOCYTE RATIO %: 0.6 %
LYMPHOCYTES # BLD AUTO: 1.31 X10(3) UL (ref 0.9–4)
LYMPHOCYTES NFR BLD AUTO: 11.8 %
MCH RBC QN AUTO: 27.7 PG (ref 27–33.2)
MCHC RBC AUTO-ENTMCNC: 33.4 G/DL (ref 31–37)
MCV RBC AUTO: 83 FL (ref 81–100)
MONOCYTES # BLD AUTO: 0.62 X10(3) UL (ref 0.1–1)
MONOCYTES NFR BLD AUTO: 5.6 %
NEUTROPHIL ABS PRELIM: 8.98 X10 (3) UL (ref 1.3–6.7)
NEUTROPHILS # BLD AUTO: 8.98 X10(3) UL (ref 1.3–6.7)
NEUTROPHILS NFR BLD AUTO: 81 %
PLATELET # BLD AUTO: 192 10(3)UL (ref 150–450)
RBC # BLD AUTO: 4.29 X10(6)UL (ref 3.8–5.1)
RED CELL DISTRIBUTION WIDTH-SD: 38.9 FL (ref 35.1–46.3)
WBC # BLD AUTO: 11.1 X10(3) UL (ref 4–13)

## 2018-06-29 PROCEDURE — 85025 COMPLETE CBC W/AUTO DIFF WBC: CPT | Performed by: OBSTETRICS & GYNECOLOGY

## 2018-06-29 RX ORDER — ACETAMINOPHEN 325 MG/1
650 TABLET ORAL EVERY 6 HOURS PRN
Status: DISCONTINUED | OUTPATIENT
Start: 2018-06-29 | End: 2018-07-01

## 2018-06-29 NOTE — CM/SW NOTE
CM met with pt to review insurance and PCP for infant with patient. Pt states that she has already spoken with her father who has a corporate insurance plan and the grandchildren are able per the plan to be on his insurance. . Pt declines having [facility]

## 2018-06-29 NOTE — PROGRESS NOTES
OB Progress Note POD#1    S: She is feeling well. Minimal VB. Pain controlled. Catheter removed and spontaneously voiding, + flatus, no BM    O:  Blood pressure 122/61, pulse 76, temperature 98.2 °F (36.8 °C), temperature source Oral, resp.  rate 18, height

## 2018-06-29 NOTE — PLAN OF CARE
POSTPARTUM    • Optimize infant feeding at the breast Progressing      Baby favors right breast. Encouraged gentle attempts on left.

## 2018-06-30 RX ORDER — HYDROCODONE BITARTRATE AND ACETAMINOPHEN 5; 325 MG/1; MG/1
1-2 TABLET ORAL EVERY 6 HOURS PRN
Qty: 20 TABLET | Refills: 0 | Status: SHIPPED | OUTPATIENT
Start: 2018-06-30 | End: 2018-07-13

## 2018-06-30 NOTE — PROGRESS NOTES
OB Progress Note POD#2    S: She is feeling well. Minimal VB. Having more pain today but its controlled with norco. Breastfeeding. Voiding without difficulty, + flatus, no BM.  C/o a mild cough, which causes some incisional discomfort    O:  Blood pressure

## 2018-06-30 NOTE — PLAN OF CARE
Problem: POSTPARTUM  Goal: Long Term Goal:Experiences normal postpartum course  INTERVENTIONS:  - Assess and monitor vital signs and lab values. - Assess fundus and lochia. - Provide ice/sitz baths for perineum discomfort.   - Monitor healing of incision/ provide assistance with proper latch. - Review techniques for milk expression (breast pumping) and storage of breast milk. Provide pumping equipment/supplies, instructions and assistance, as needed. - Encourage rooming-in and breast feeding on demand.   - Review techniques for milk expression (breast pumping) and storage of breast milk. Provide pumping equipment/supplies, instructions and assistance, as needed. - Encourage rooming-in and breast feeding on demand.  - Encourage skin-to-skin contact.   - Provi

## 2018-06-30 NOTE — PROGRESS NOTES
Per Chandrika, she received RhoGam when she was admitted here 6/26/18 after a fall at home, showed me card staff gave her, states received it IV on the 26th. Dr Alberto Hampton aware, no need for another dose now.

## 2018-07-01 VITALS
DIASTOLIC BLOOD PRESSURE: 69 MMHG | OXYGEN SATURATION: 98 % | WEIGHT: 180 LBS | HEIGHT: 63.5 IN | HEART RATE: 80 BPM | RESPIRATION RATE: 17 BRPM | TEMPERATURE: 98 F | SYSTOLIC BLOOD PRESSURE: 116 MMHG | BODY MASS INDEX: 31.5 KG/M2

## 2018-07-01 NOTE — DISCHARGE SUMMARY
[facility]  OB Discharge Summary    Lourdes Blackman Patient Status:  Inpatient    9/10/1995 MRN [id number]   [facility] 1SW-J Attending Oleksandr Jay MD   [facility] Day # 3 PCP None Pcp     Date of Admission: 2018  Date of D

## 2018-07-01 NOTE — PLAN OF CARE
Problem: POSTPARTUM  Goal: Long Term Goal:Experiences normal postpartum course  INTERVENTIONS:  - Assess and monitor vital signs and lab values. - Assess fundus and lochia. - Provide ice/sitz baths for perineum discomfort.   - Monitor healing of incision/ pain/trauma. - Instruct and provide assistance with proper latch. - Review techniques for milk expression (breast pumping) and storage of breast milk. Provide pumping equipment/supplies, instructions and assistance, as needed.   - Encourage rooming-in and breast  INTERVENTIONS:  - Initiate breast feeding within first hour after birth. - Monitor effectiveness of current breast feeding efforts.   - Assess support systems available to mother/family.  - Identify cultural beliefs/practices regarding lactation,

## 2018-07-01 NOTE — PROGRESS NOTES
OB Progress Note POD#3    S: She is feeling well. Minimal VB. Pain controlled. Breastfeeding. Voiding without difficulty, + flatus, no BM    O:  Blood pressure 116/69, pulse 80, temperature 97.9 °F (36.6 °C), temperature source Axillary, resp.  rate 17, hei

## 2018-07-06 ENCOUNTER — TELEPHONE (OUTPATIENT)
Dept: OBGYN UNIT | Facility: HOSPITAL | Age: 23
End: 2018-07-06

## 2018-07-06 NOTE — PROGRESS NOTES
Outgoing cradle call placed. No answer. Left general voicemail message.    , CC attempts are complete

## 2018-09-18 ENCOUNTER — CHARTING TRANS (OUTPATIENT)
Dept: OTHER | Age: 23
End: 2018-09-18

## 2018-10-18 ENCOUNTER — OFFICE VISIT (OUTPATIENT)
Dept: FAMILY MEDICINE CLINIC | Facility: CLINIC | Age: 23
End: 2018-10-18
Payer: COMMERCIAL

## 2018-10-18 VITALS
RESPIRATION RATE: 20 BRPM | SYSTOLIC BLOOD PRESSURE: 110 MMHG | DIASTOLIC BLOOD PRESSURE: 68 MMHG | HEART RATE: 86 BPM | OXYGEN SATURATION: 95 % | TEMPERATURE: 99 F

## 2018-10-18 DIAGNOSIS — J45.909 ACUTE ASTHMA: Primary | ICD-10-CM

## 2018-10-18 PROCEDURE — 99213 OFFICE O/P EST LOW 20 MIN: CPT | Performed by: NURSE PRACTITIONER

## 2018-10-18 PROCEDURE — 94640 AIRWAY INHALATION TREATMENT: CPT | Performed by: NURSE PRACTITIONER

## 2018-10-18 RX ORDER — FLUTICASONE PROPIONATE AND SALMETEROL XINAFOATE 115; 21 UG/1; UG/1
AEROSOL, METERED RESPIRATORY (INHALATION)
Qty: 1 INHALER | Refills: 0 | Status: SHIPPED | OUTPATIENT
Start: 2018-10-18

## 2018-10-18 RX ORDER — ALBUTEROL SULFATE 2.5 MG/3ML
2.5 SOLUTION RESPIRATORY (INHALATION) ONCE
Status: COMPLETED | OUTPATIENT
Start: 2018-10-18 | End: 2018-10-18

## 2018-10-18 RX ORDER — FLUTICASONE PROPIONATE AND SALMETEROL XINAFOATE 115; 21 UG/1; UG/1
AEROSOL, METERED RESPIRATORY (INHALATION)
Refills: 0 | COMMUNITY
Start: 2018-09-18 | End: 2018-10-18

## 2018-10-18 RX ORDER — ALBUTEROL SULFATE 90 UG/1
2 AEROSOL, METERED RESPIRATORY (INHALATION) EVERY 4 HOURS PRN
Qty: 1 INHALER | Refills: 0 | Status: SHIPPED | OUTPATIENT
Start: 2018-10-18 | End: 2019-07-03

## 2018-10-18 RX ORDER — PREDNISONE 20 MG/1
40 TABLET ORAL DAILY
Qty: 10 TABLET | Refills: 0 | Status: SHIPPED | OUTPATIENT
Start: 2018-10-18 | End: 2018-10-23

## 2018-10-18 RX ADMIN — ALBUTEROL SULFATE 2.5 MG: 2.5 SOLUTION RESPIRATORY (INHALATION) at 17:05:00

## 2018-10-18 NOTE — PROGRESS NOTES
CHIEF COMPLAINT:   Patient presents with:  Asthma: ran out of advair 120mcg        HPI:   Alexis Lee is a 20year old female who presents for cough/asthma for  3  days. Reports ran out of her advair 3 days ago and has been getting worse since. • MRSA (methicillin resistant Staphylococcus aureus)    • Nausea & vomiting     Severe Nausea vomiitng    • Seasonal allergies    • Sinusitis       Social History:  Social History    Tobacco Use      Smoking status: Former Smoker      Smokeless tobacco: Ne PLAN:  Neb tx given in office, pt. Reports much improvement after neb, post neb lung sound with only scant wheezing and much improved aeration. Recommended pt. Take steroids and use albuterol and then after steroids start Dulera.   Needs to establish care · Take prescribed medicine exactly at the times advised. If you need medicine such as from a hand held inhaler or aerosol breathing machine more than every 4 hours, contact your healthcare provider or seek immediate medical attention.  If prescribed an anti · Lips or fingernails turning gray or blue  Date Last Reviewed: 5/1/2017  © 3930-9714 [employer]. [address] [facility] [place] [address] [place] [address]. All rights reserved. This information is not intended as a substitute for professional medical care.

## 2018-10-25 ENCOUNTER — TELEPHONE (OUTPATIENT)
Dept: FAMILY MEDICINE CLINIC | Facility: CLINIC | Age: 23
End: 2018-10-25

## 2018-10-25 ENCOUNTER — OFFICE VISIT (OUTPATIENT)
Dept: FAMILY MEDICINE CLINIC | Facility: CLINIC | Age: 23
End: 2018-10-25

## 2018-10-25 DIAGNOSIS — Z53.29 NO-SHOW FOR APPOINTMENT: Primary | ICD-10-CM

## 2018-10-25 PROCEDURE — 99998 NO SHOW: CPT | Performed by: FAMILY MEDICINE

## 2018-10-25 NOTE — TELEPHONE ENCOUNTER
LMOM for patient regarding NO SHOW status for office visit on 10/25/61742, with Dr. Jones Home. I informed patient our office has a $75.75 NO SHOW FEE POLICY. Patient will be charged $40.00 for today's and any future NO SHOW appointments.

## 2018-11-23 ENCOUNTER — IMAGING SERVICES (OUTPATIENT)
Dept: OTHER | Age: 23
End: 2018-11-23

## 2018-11-27 VITALS
SYSTOLIC BLOOD PRESSURE: 104 MMHG | DIASTOLIC BLOOD PRESSURE: 60 MMHG | HEART RATE: 80 BPM | TEMPERATURE: 97.4 F | RESPIRATION RATE: 16 BRPM | OXYGEN SATURATION: 99 %

## 2019-01-08 ENCOUNTER — PATIENT OUTREACH (OUTPATIENT)
Dept: FAMILY MEDICINE CLINIC | Facility: CLINIC | Age: 24
End: 2019-01-08

## 2019-03-05 VITALS
DIASTOLIC BLOOD PRESSURE: 60 MMHG | OXYGEN SATURATION: 97 % | HEART RATE: 94 BPM | SYSTOLIC BLOOD PRESSURE: 100 MMHG | RESPIRATION RATE: 16 BRPM | TEMPERATURE: 97.6 F

## 2019-03-11 ENCOUNTER — PATIENT OUTREACH (OUTPATIENT)
Dept: FAMILY MEDICINE CLINIC | Facility: CLINIC | Age: 24
End: 2019-03-11

## 2019-06-07 ENCOUNTER — LAB SERVICES (OUTPATIENT)
Dept: LAB | Age: 24
End: 2019-06-07

## 2019-06-07 ENCOUNTER — OFFICE VISIT (OUTPATIENT)
Dept: INTERNAL MEDICINE | Age: 24
End: 2019-06-07

## 2019-06-07 VITALS
BODY MASS INDEX: 31.89 KG/M2 | OXYGEN SATURATION: 96 % | TEMPERATURE: 96.7 F | WEIGHT: 186.8 LBS | DIASTOLIC BLOOD PRESSURE: 76 MMHG | HEIGHT: 64 IN | HEART RATE: 61 BPM | SYSTOLIC BLOOD PRESSURE: 108 MMHG

## 2019-06-07 DIAGNOSIS — Z00.00 PREVENTATIVE HEALTH CARE: ICD-10-CM

## 2019-06-07 DIAGNOSIS — J45.20 MILD INTERMITTENT ASTHMA WITHOUT COMPLICATION: ICD-10-CM

## 2019-06-07 DIAGNOSIS — J45.20 MILD INTERMITTENT ASTHMA WITHOUT COMPLICATION: Primary | ICD-10-CM

## 2019-06-07 DIAGNOSIS — J30.1 NON-SEASONAL ALLERGIC RHINITIS DUE TO POLLEN: ICD-10-CM

## 2019-06-07 PROBLEM — O26.899 RH NEGATIVE, ANTEPARTUM: Status: ACTIVE | Noted: 2017-11-07

## 2019-06-07 PROBLEM — Z67.91 RH NEGATIVE, ANTEPARTUM: Status: ACTIVE | Noted: 2017-11-07

## 2019-06-07 PROBLEM — S93.491A SPRAIN OF ANTERIOR TALOFIBULAR LIGAMENT OF RIGHT ANKLE: Status: ACTIVE | Noted: 2019-01-24

## 2019-06-07 PROBLEM — R55 SYNCOPE AND COLLAPSE: Status: ACTIVE | Noted: 2018-06-26

## 2019-06-07 LAB
25(OH)D3 SERPL-MCNC: 21.1 NG/ML (ref 30–100)
ALBUMIN SERPL BCG-MCNC: 4.6 G/DL (ref 3.6–5.1)
ALP SERPL-CCNC: 69 U/L (ref 45–130)
ALT SERPL W/O P-5'-P-CCNC: 17 U/L (ref 7–34)
AST SERPL-CCNC: 13 U/L (ref 9–37)
BILIRUB SERPL-MCNC: 0.5 MG/DL (ref 0–1)
BUN SERPL-MCNC: 10 MG/DL (ref 7–20)
CALCIUM SERPL-MCNC: 10.6 MG/DL (ref 8.6–10.6)
CHLORIDE SERPL-SCNC: 105 MMOL/L (ref 96–107)
CREAT SERPL-MCNC: 0.9 MG/DL (ref 0.5–1.4)
DIFFERENTIAL TYPE: NORMAL
GFR SERPL CREATININE-BSD FRML MDRD: >60 ML/MIN/{1.73M2}
GFR SERPL CREATININE-BSD FRML MDRD: >60 ML/MIN/{1.73M2}
GLUCOSE SERPL-MCNC: 91 MG/DL (ref 70–200)
HCO3 SERPL-SCNC: 27 MMOL/L (ref 22–32)
HEMATOCRIT: 43.3 % (ref 34–45)
HEMOGLOBIN: 14.5 G/DL (ref 11.2–15.7)
LYMPH PERCENT: 41.8 % (ref 20.5–51.1)
LYMPHOCYTE ABSOLUTE #: 2.8 10*3/UL (ref 1.2–3.4)
MEAN CORPUSCULAR HGB CONCENTRATION: 33.5 % (ref 32–36)
MEAN CORPUSCULAR HGB: 29.7 PG (ref 27–34)
MEAN CORPUSCULAR VOLUME: 88.7 FL (ref 79–95)
MEAN PLATELET VOLUME: 9.6 FL (ref 8.6–12.4)
MIXED %: 11.5 % (ref 4.3–12.9)
MIXED ABSOLUTE #: 0.8 10*3/UL (ref 0.2–0.9)
NEUTROPHIL ABSOLUTE #: 3.2 10*3/UL (ref 1.4–6.5)
NEUTROPHIL PERCENT: 46.7 % (ref 34–73.5)
PLATELET COUNT: 277 10*3/UL (ref 150–400)
POTASSIUM SERPL-SCNC: 4.2 MMOL/L (ref 3.5–5.3)
PROT SERPL-MCNC: 7 G/DL (ref 6.2–8.1)
RED BLOOD CELL COUNT: 4.88 10*6/UL (ref 3.7–5.2)
RED CELL DISTRIBUTION WIDTH: 12.8 % (ref 11.3–14.8)
SODIUM SERPL-SCNC: 139 MMOL/L (ref 136–146)
TSH SERPL DL<=0.05 MIU/L-ACNC: 2.99 M[IU]/L (ref 0.3–4.82)
WHITE BLOOD CELL COUNT: 6.8 10*3/UL (ref 4–10)

## 2019-06-07 PROCEDURE — 82306 VITAMIN D 25 HYDROXY: CPT | Performed by: INTERNAL MEDICINE

## 2019-06-07 PROCEDURE — 80050 GENERAL HEALTH PANEL: CPT | Performed by: INTERNAL MEDICINE

## 2019-06-07 PROCEDURE — 36415 COLL VENOUS BLD VENIPUNCTURE: CPT | Performed by: INTERNAL MEDICINE

## 2019-06-07 PROCEDURE — 99213 OFFICE O/P EST LOW 20 MIN: CPT | Performed by: INTERNAL MEDICINE

## 2019-06-07 RX ORDER — MONTELUKAST SODIUM 10 MG/1
10 TABLET ORAL EVERY EVENING
Qty: 30 TABLET | Refills: 5 | Status: SHIPPED | OUTPATIENT
Start: 2019-06-07

## 2019-06-07 RX ORDER — NEBULIZER ACCESSORIES
KIT MISCELLANEOUS
Qty: 1 KIT | Refills: 0 | Status: SHIPPED | OUTPATIENT
Start: 2019-06-07 | End: 2020-10-19 | Stop reason: SDUPTHER

## 2019-06-07 RX ORDER — FLUTICASONE PROPIONATE AND SALMETEROL XINAFOATE 115; 21 UG/1; UG/1
AEROSOL, METERED RESPIRATORY (INHALATION)
COMMUNITY
Start: 2018-10-18 | End: 2019-06-07 | Stop reason: SDUPTHER

## 2019-06-07 RX ORDER — FLUTICASONE PROPIONATE AND SALMETEROL XINAFOATE 115; 21 UG/1; UG/1
2 AEROSOL, METERED RESPIRATORY (INHALATION) 2 TIMES DAILY
Qty: 12 G | Refills: 3 | Status: SHIPPED | OUTPATIENT
Start: 2019-06-07

## 2019-06-07 RX ORDER — ALBUTEROL SULFATE 90 UG/1
2 AEROSOL, METERED RESPIRATORY (INHALATION) EVERY 4 HOURS PRN
Qty: 1 INHALER | Refills: 3 | Status: SHIPPED | OUTPATIENT
Start: 2019-06-07

## 2019-06-07 RX ORDER — ALBUTEROL SULFATE 2.5 MG/3ML
2.5 SOLUTION RESPIRATORY (INHALATION) EVERY 4 HOURS PRN
Qty: 375 ML | Refills: 1 | Status: SHIPPED | OUTPATIENT
Start: 2019-06-07 | End: 2019-09-26 | Stop reason: SDUPTHER

## 2019-06-07 RX ORDER — FLUTICASONE PROPIONATE 50 MCG
2 SPRAY, SUSPENSION (ML) NASAL DAILY
Qty: 16 G | Refills: 12 | Status: SHIPPED | OUTPATIENT
Start: 2019-06-07

## 2019-06-07 RX ORDER — ALBUTEROL SULFATE 90 UG/1
2 AEROSOL, METERED RESPIRATORY (INHALATION)
COMMUNITY
Start: 2015-09-16 | End: 2019-06-07 | Stop reason: SDUPTHER

## 2019-06-07 RX ORDER — ALBUTEROL SULFATE 2.5 MG/3ML
2.5 SOLUTION RESPIRATORY (INHALATION)
COMMUNITY
Start: 2017-10-22 | End: 2019-06-07 | Stop reason: SDUPTHER

## 2019-06-11 RX ORDER — ERGOCALCIFEROL 1.25 MG/1
50000 CAPSULE ORAL
Qty: 12 CAPSULE | Refills: 0 | Status: SHIPPED | OUTPATIENT
Start: 2019-06-17

## 2019-07-03 PROBLEM — O26.899 RH NEGATIVE, ANTEPARTUM: Status: ACTIVE | Noted: 2017-11-07

## 2019-07-03 PROBLEM — Z67.91 RH NEGATIVE, ANTEPARTUM: Status: ACTIVE | Noted: 2017-11-07

## 2019-07-22 PROCEDURE — 86256 FLUORESCENT ANTIBODY TITER: CPT | Performed by: OTHER

## 2019-07-22 PROCEDURE — 82784 ASSAY IGA/IGD/IGG/IGM EACH: CPT | Performed by: OTHER

## 2019-09-26 RX ORDER — ALBUTEROL SULFATE 2.5 MG/3ML
SOLUTION RESPIRATORY (INHALATION)
Qty: 375 ML | Refills: 0 | Status: SHIPPED | OUTPATIENT
Start: 2019-09-26 | End: 2019-11-21 | Stop reason: SDUPTHER

## 2019-11-22 RX ORDER — ALBUTEROL SULFATE 2.5 MG/3ML
SOLUTION RESPIRATORY (INHALATION)
Qty: 375 ML | Refills: 2 | Status: SHIPPED | OUTPATIENT
Start: 2019-11-22

## 2019-12-12 PROBLEM — F32.A DEPRESSION: Status: ACTIVE | Noted: 2019-12-12

## 2020-03-10 ENCOUNTER — IMAGING SERVICES (OUTPATIENT)
Dept: GENERAL RADIOLOGY | Age: 25
End: 2020-03-10
Attending: FAMILY MEDICINE

## 2020-03-10 ENCOUNTER — WALK IN (OUTPATIENT)
Dept: URGENT CARE | Age: 25
End: 2020-03-10

## 2020-03-10 VITALS
OXYGEN SATURATION: 99 % | HEART RATE: 82 BPM | SYSTOLIC BLOOD PRESSURE: 106 MMHG | DIASTOLIC BLOOD PRESSURE: 60 MMHG | TEMPERATURE: 98.2 F | RESPIRATION RATE: 16 BRPM

## 2020-03-10 DIAGNOSIS — R07.9 CHEST PAIN, UNSPECIFIED TYPE: ICD-10-CM

## 2020-03-10 DIAGNOSIS — S20.219A CONTUSION OF CHEST WALL, UNSPECIFIED LATERALITY, INITIAL ENCOUNTER: ICD-10-CM

## 2020-03-10 DIAGNOSIS — R07.9 CHEST PAIN, UNSPECIFIED TYPE: Primary | ICD-10-CM

## 2020-03-10 DIAGNOSIS — S22.20XA CLOSED FRACTURE OF STERNUM, UNSPECIFIED PORTION OF STERNUM, INITIAL ENCOUNTER: ICD-10-CM

## 2020-03-10 PROBLEM — T40.1X1A ACCIDENTAL OVERDOSE OF HEROIN (CMD): Status: ACTIVE | Noted: 2020-03-10

## 2020-03-10 PROBLEM — F32.A DEPRESSION: Status: ACTIVE | Noted: 2019-12-12

## 2020-03-10 PROCEDURE — 71111 X-RAY EXAM RIBS/CHEST4/> VWS: CPT | Performed by: RADIOLOGY

## 2020-03-10 PROCEDURE — 71120 X-RAY EXAM BREASTBONE 2/>VWS: CPT | Performed by: RADIOLOGY

## 2020-03-10 PROCEDURE — 99214 OFFICE O/P EST MOD 30 MIN: CPT | Performed by: FAMILY MEDICINE

## 2020-03-10 RX ORDER — AMOXICILLIN AND CLAVULANATE POTASSIUM 875; 125 MG/1; MG/1
1 TABLET, FILM COATED ORAL 2 TIMES DAILY
COMMUNITY
End: 2020-10-23 | Stop reason: ALTCHOICE

## 2020-10-23 RX ORDER — NEBULIZER ACCESSORIES
KIT MISCELLANEOUS
Qty: 1 KIT | Refills: 0 | Status: SHIPPED | OUTPATIENT
Start: 2020-10-23

## 2021-12-21 ENCOUNTER — APPOINTMENT (OUTPATIENT)
Dept: GENERAL RADIOLOGY | Facility: HOSPITAL | Age: 26
End: 2021-12-21
Attending: NURSE PRACTITIONER
Payer: COMMERCIAL

## 2021-12-21 ENCOUNTER — HOSPITAL ENCOUNTER (EMERGENCY)
Facility: HOSPITAL | Age: 26
Discharge: HOME | End: 2021-12-21
Payer: COMMERCIAL

## 2021-12-21 VITALS
TEMPERATURE: 98 F | RESPIRATION RATE: 20 BRPM | WEIGHT: 152 LBS | DIASTOLIC BLOOD PRESSURE: 80 MMHG | HEIGHT: 63.5 IN | HEART RATE: 88 BPM | SYSTOLIC BLOOD PRESSURE: 122 MMHG | BODY MASS INDEX: 26.6 KG/M2 | OXYGEN SATURATION: 97 %

## 2021-12-21 DIAGNOSIS — J45.901 ASTHMA EXACERBATION, MILD: ICD-10-CM

## 2021-12-21 DIAGNOSIS — U07.1 COVID-19: Primary | ICD-10-CM

## 2021-12-21 PROCEDURE — 81025 URINE PREGNANCY TEST: CPT

## 2021-12-21 PROCEDURE — 0202U NFCT DS 22 TRGT SARS-COV-2: CPT | Performed by: NURSE PRACTITIONER

## 2021-12-21 PROCEDURE — 71046 X-RAY EXAM CHEST 2 VIEWS: CPT | Performed by: NURSE PRACTITIONER

## 2021-12-21 PROCEDURE — 99284 EMERGENCY DEPT VISIT MOD MDM: CPT

## 2021-12-21 RX ORDER — PREDNISONE 20 MG/1
60 TABLET ORAL ONCE
Status: COMPLETED | OUTPATIENT
Start: 2021-12-21 | End: 2021-12-21

## 2021-12-21 RX ORDER — IPRATROPIUM BROMIDE AND ALBUTEROL SULFATE 2.5; .5 MG/3ML; MG/3ML
3 SOLUTION RESPIRATORY (INHALATION) ONCE
Status: DISCONTINUED | OUTPATIENT
Start: 2021-12-21 | End: 2021-12-21

## 2021-12-21 RX ORDER — PREDNISONE 20 MG/1
40 TABLET ORAL DAILY
Qty: 8 TABLET | Refills: 0 | Status: SHIPPED | OUTPATIENT
Start: 2021-12-21 | End: 2021-12-25

## 2021-12-21 NOTE — ED INITIAL ASSESSMENT (HPI)
Pt to ED with c/o cough for a few days. Pt denies fever. Pt states vaccinated to COVID-19 and was positive for COVID over Thanksgiving. Pt with hx of asthma. Pt concerned for RSV. Pt states exposed to children +for RSV. No respiratory distress noted.  Pt ab

## 2021-12-21 NOTE — ED PROVIDER NOTES
Patient Seen in: [facility] AND [facility] Emergency Department      History   Patient presents with:  Difficulty Breathing    Stated Complaint: sob, asthma; children dx with RSV    Subjective:   HPI    57-year-old female presents the emergency department for ev Drug use: Yes      Frequency: 7.0 times per week      Types: Cannabis      Comment: several times a day, vape, edibles, joints             Review of Systems   All other systems reviewed and are negative.       Positive for stated complaint: sob, asthma; chi person, place, and time. Psychiatric:         Mood and Affect: Mood normal.         Behavior: Behavior normal.         Thought Content:  Thought content normal.         Judgment: Judgment normal.               ED Course     Labs Reviewed   RESPIRATORY FLU confirm primary for follow-up.                          Disposition and Plan     Clinical Impression:  [id number]  (primary encounter diagnosis)  Asthma exacerbation, mild     Disposition:  Discharge  12/21/2021  2:22 pm    Follow-up:  DO David Dowell

## 2021-12-21 NOTE — ED QUICK NOTES
Pt states had COVID at [facility]. Is Vaccinated. States has cough and chills. States recently with children with resp symptoms. Concerned about possible RSV.

## (undated) DEVICE — Device

## (undated) NOTE — LETTER
March 11, 2019        Jo Denny  [address] [facility]      Dear Elayne Libman: We have attempted to contact you by phone with no success.  In an effort to provide quality patient care we are reaching out to you to contact the office at you

## (undated) NOTE — ED AVS SNAPSHOT
Darius Schroeder   MRN: [id number]    Department:  [facility] Emergency Department   Date of Visit:  12/4/2017           Disclosure     Insurance plans vary and the physician(s) referred by the ER may not be covered by your plan.  Please contact y tell this physician (or your personal doctor if your instructions are to return to your personal doctor) about any new or lasting problems. The primary care or specialist physician will see patients referred from the [facility] Emergency Department.  Swapna Garcia

## (undated) NOTE — ED AVS SNAPSHOT
Janette Cantrell   MRN: [id number]    Department:  [facility] Emergency Department   Date of Visit:  3/23/2018           Disclosure     Insurance plans vary and the physician(s) referred by the ER may not be covered by your plan.  Please contact y tell this physician (or your personal doctor if your instructions are to return to your personal doctor) about any new or lasting problems. The primary care or specialist physician will see patients referred from the [facility] Emergency Department.  Jono Amaya

## (undated) NOTE — LETTER
January 8, 2019        Israel Mustafa  [address] [facility]      Dear Alexis Francis: We have attempted to contact you by phone with no success.  In an effort to provide quality patient care we are reaching out to you to contact the office at yo

## (undated) NOTE — LETTER
[facility]  Abeltemi V[address] [facility], [address] [place] [address]    Consent for Operation    Date: __________________    Time: _______________    1.  I authorize the performance upon Chandrika Pereira the following operation:                              Rep procedure has been videotaped, the surgeon will obtain the original videotape. The hospital will not be responsible for storage or maintenance of this tape.     6. For the purpose of advancing medical education, I consent to the admittance of observers to t STATEMENTS REQUIRING INSERTION OR COMPLETION WERE FILLED IN.     Signature of Patient:   ___________________________    When the patient is a minor or mentally incompetent to give consent:  Signature of person authorized to consent for patient: ____________ these medicines may increase my risk of anesthetic complications. · If I am allergic to anything or have had a reaction to anesthesia before. 3. I understand how the anesthesia medicine will help me (benefits).     4. I understand that with any type of patient’s representative) and answered their questions. The patient or their representative has agreed to have anesthesia services.     _____________________________________________________________________________  Witness        Date   Time  I have cecilia

## (undated) NOTE — LETTER
Tosin Russell [address] [facility] [address], [address] [facility]     PICC LINE INSERTION CONSENT     I agree to have a Peripherally Inserted Central Catheter (PICC) placed in my arm.    1. The PICC insertion procedure, care, maintenance, risks, benefits, and c goals; and potential problems that might occur during recuperation.  They also discussed reasonable alternatives to the PICC, including risks, benefits, and side effects related to the alternatives and risks related to not receiving this procedure      ____